# Patient Record
Sex: MALE | Race: WHITE
[De-identification: names, ages, dates, MRNs, and addresses within clinical notes are randomized per-mention and may not be internally consistent; named-entity substitution may affect disease eponyms.]

---

## 2017-03-06 ENCOUNTER — HOSPITAL ENCOUNTER (OUTPATIENT)
Dept: HOSPITAL 61 - LAB | Age: 52
Discharge: HOME | End: 2017-03-06
Attending: NURSE PRACTITIONER
Payer: COMMERCIAL

## 2017-03-06 DIAGNOSIS — N52.9: Primary | ICD-10-CM

## 2017-03-06 LAB — TESTOST SERPL-MCNC: 350 NG/DL (ref 348–1197)

## 2017-03-06 PROCEDURE — 84403 ASSAY OF TOTAL TESTOSTERONE: CPT

## 2017-03-06 PROCEDURE — 36415 COLL VENOUS BLD VENIPUNCTURE: CPT

## 2017-05-24 ENCOUNTER — HOSPITAL ENCOUNTER (OUTPATIENT)
Dept: HOSPITAL 61 - ECHO | Age: 52
Discharge: HOME | End: 2017-05-24
Attending: INTERNAL MEDICINE
Payer: COMMERCIAL

## 2017-05-24 DIAGNOSIS — Z01.818: Primary | ICD-10-CM

## 2017-05-24 PROCEDURE — 93350 STRESS TTE ONLY: CPT

## 2017-05-24 PROCEDURE — 93017 CV STRESS TEST TRACING ONLY: CPT

## 2017-05-24 NOTE — CARD
--------------- APPROVED REPORT --------------





INDICATION

Cardiac clearance, Pre op



RISK FACTORS

Hypertension 

Obesity   



PROCEDURE

The patient underwent an exercise Stress Test using the Denny protocol. Blood pressure, heart rate, a
nd EKG were monitored.

An Echocardiogram was performed by technician in four stages in quad fashion.  At peak stress four se
lected images were obtained and placed side by side with resting images for comparison.



STRESS ECHO FINDINGS

The resting Echocardiogram showed normal left ventricular contractility with an estimated Ejection Fr
action of about 65 %. 

Normal augmentation of myocardial wall segments using a 16 segment model.



Test Type:          Exercise

Stress Nurse/Tech: e, muhammad, rn

Test Indications: cardiac clearance

Cardiac History and Allergies: HTN, SEE EHR

Medications:     SEE EHR

Medical History: DIABETES, SEE EHR

Resting ECG:     SR

Resting Heart Rate: 67 bpm

Resting Blood Pressure: 153/80mmHg

Pretest Chest Pain: No chest pain



Nurse/Tech Notes

NO RESPIRATORY DISTRESS OR DIFFICULTY, NSR PER INITAL HOOK UP.

Consent: The procedure was explained to the patient in lay terms. Informed consent was witnessed. Simon
eout was entered into [facility]. History and Stress Test performed by CARRINGTON MARTINEZ



Stress Symptoms

LEG FATIGUE, SOA.



POST EXERCISE

Reason for Termination: Reached target heart rate

Target HR: 142

Max HR: 167 bpm

96% of Maximum Predicted HR: 167 bpm

Exercise duration: 10:00 min:sec, 3 Stage

Exercise capacity: 12.8METs

Max Blood Pressure: 170/85mmHg

Blood Pressure response to exercise: Normal blood pressure response during stress.

Heart Rate response to exercise: WNL

Chest Pain: No. 

Arrhythmia: No. 

ST Change: No. 



INTERPRETATION

Stress EKG Conclusion: Baseline EKG showed sinus rhythm.  No ischemic changes at peak stress.  No arr
hythmias.



<Conclusion>

Treadmill exercise stress echocardiogram did not show any evidence of ischemia or infarct.

Normal left ventricle systolic function with ejection fraction estimated at 65%.

Patient had excellent activity tolerance.  Low risk for cardiac events.

## 2020-01-26 ENCOUNTER — HOSPITAL ENCOUNTER (EMERGENCY)
Dept: HOSPITAL 61 - ER | Age: 55
Discharge: HOME | End: 2020-01-26
Payer: COMMERCIAL

## 2020-01-26 VITALS — HEIGHT: 70 IN | WEIGHT: 260.37 LBS | BODY MASS INDEX: 37.27 KG/M2

## 2020-01-26 VITALS — SYSTOLIC BLOOD PRESSURE: 149 MMHG | DIASTOLIC BLOOD PRESSURE: 84 MMHG

## 2020-01-26 DIAGNOSIS — E11.9: ICD-10-CM

## 2020-01-26 DIAGNOSIS — S93.521A: Primary | ICD-10-CM
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
## 2020-01-26 DIAGNOSIS — K21.9: ICD-10-CM
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
## 2020-01-26 DIAGNOSIS — Z88.0: ICD-10-CM
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
## 2020-01-26 DIAGNOSIS — I10: ICD-10-CM
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
## 2020-01-26 DIAGNOSIS — L53.9: ICD-10-CM

## 2020-01-26 PROCEDURE — 99284 EMERGENCY DEPT VISIT MOD MDM: CPT

## 2020-01-26 PROCEDURE — 73630 X-RAY EXAM OF FOOT: CPT

## 2020-01-26 NOTE — PHYS DOC
Past Medical History


Past Medical History:  Diabetes-Type II, GERD, Hypertension, Other


Additional Past Medical Histor:  LUPUS, SLEEP APNEA


Past Surgical History:  No Surgical History


Alcohol Use:  Occasionally





Adult General


Chief Complaint


Chief Complaint:  LOWEREXTREMITY INJURY





HPI


HPI





Patient is a 54  year old male who presents with presents with right great toe 

pain after fall. Patient reports 2 days ago his foot on some ice, with his niece

regarding hours, and his right great toe reflex. States he has had some pain 

since that time. States he has been walking on it, however slowly, and has had 

some continued pain in that digit. States he has taken some Tylenol and 

ibuprofen for discomfort which has helped a little bit. States he has noticed 

the area to be swollen, states he is able to move it, however has severe 

increase in discomfort when he tries to extend his toe.





Review of Systems


Review of Systems





Constitutional: Denies fever or chills []





Musculoskeletal: Denies back pain reports pain to right great toe denies pain to

 knee, legs other discomfort other than to his toe and foot[]


Integument: Denies rash or skin lesions reports redness noted to right great toe

 since his fall[]


Neurologic: Denies headache, focal weakness or sensory changes []


Endocrine: Denies polyuria or polydipsia []





All other systems were reviewed and found to be within normal limits, except as 

documented in this note.





Allergies


Allergies





Allergies








Coded Allergies Type Severity Reaction Last Updated Verified


 


  Penicillins Allergy Unknown  6/11/14 Yes











Physical Exam


Physical Exam





Constitutional: Well developed, well nourished, no acute distress, non-toxic 

appearance. []





Cardiovascular:Heart rate regular rhythm, no murmur []


Lungs & Thorax:  Bilateral breath sounds clear to auscultation []


Abdomen: Bowel sounds normal, soft, no tenderness, no masses, no pulsatile 

masses. [] 


Skin: Warm, dry, no rash. Erythematous to right great toe, medially, near where 

patient reports tenderness. No lesions noted[] 


Back: No tenderness, no CVA tenderness. [] 


Extremities:, no cyanosis, no clubbing, ROM intact, no edema. Tenderness noted 

on palpation to distal aspect of right great toe, near base of metatarsal. No 

deformity, no tenderness noted on palpation of toe. Patient able to flex and 

extend toe, however reports severe increase in pain when he attempts to extend 

brisk capillary refill to toe noted.[] 


Neurologic: Alert and oriented X 3, normal motor function, normal sensory 

function, no focal deficits noted. []


Psychologic: Affect normal, judgement normal, mood normal. []





Current Patient Data


Vital Signs





                                   Vital Signs








  Date Time  Temp Pulse Resp B/P (MAP) Pulse Ox O2 Delivery O2 Flow Rate FiO2


 


1/26/20 12:00 98.8 74 16 149/84 (105) 95 Room Air  





 98.8       











EKG


EKG


[]





Radiology/Procedures


Radiology/Procedures


[]Examination: FOOT RIGHT 3V


 


History: Great toe pain and swelling


 


Comparison/Correlation: None


 


Findings: Three-view examination of the right foot was performed. Small 


calcaneal spur is present. No fracture or bony destruction.


 


Degenerative changes of the first metatarsophalangeal joint are minimal. 


Subchondral sclerosis of the first metatarsal head noted. Interphalangeal 


joints are unremarkable for the patient's age. Midfoot is unremarkable.


 


 


Impression:


Subchondral sclerosis of the first metatarsal head which may be related to


degenerative change. Minimal first metatarsophalangeal joint narrowing.


 


Electronically signed by: Eric Negrete MD (1/26/2020 1:19 PM) [facility]





Course & Med Decision Making


Course & Med Decision Making


Pertinent Labs and Imaging studies reviewed. (See chart for details)





[]





Dragon Disclaimer


Dragon Disclaimer


This electronic medical record was generated, in whole or in part, using a voice

 recognition dictation system.





Departure


Departure


Impression:  


   Primary Impression:  


   Sprain of right great toe


Disposition:  01 HOME, SELF-CARE


Condition:  GOOD


Referrals:  


[facility] (PCP)


Patient Instructions:  Foot Sprain





Additional Instructions:  


As we discussed, wear the hard-soled shoe for the next week to give your 

ligaments a chance to improve. Continue tylenol/ibuprofen as needed for 

discomfort. You may continue to place ice as needed. Follow up with your primary

 care provider as needed.





Problem Qualifiers








   Primary Impression:  


   Sprain of right great toe


   Encounter type:  initial encounter  Qualified Codes:  S93.501A - Unspecified 

   sprain of right great toe, initial encounter








MARLENE MEDINA              Jan 26, 2020 12:55

## 2020-01-26 NOTE — RAD
Examination: FOOT RIGHT 3V

 

History: Great toe pain and swelling

 

Comparison/Correlation: None

 

Findings: Three-view examination of the right foot was performed. Small 

calcaneal spur is present. No fracture or bony destruction.

 

Degenerative changes of the first metatarsophalangeal joint are minimal. 

Subchondral sclerosis of the first metatarsal head noted. Interphalangeal 

joints are unremarkable for the patient's age. Midfoot is unremarkable.

 

 

Impression:

Subchondral sclerosis of the first metatarsal head which may be related to

degenerative change. Minimal first metatarsophalangeal joint narrowing.

 

Electronically signed by: Eric Negrete MD (1/26/2020 1:19 PM) [facility]